# Patient Record
Sex: MALE | Race: BLACK OR AFRICAN AMERICAN
[De-identification: names, ages, dates, MRNs, and addresses within clinical notes are randomized per-mention and may not be internally consistent; named-entity substitution may affect disease eponyms.]

---

## 2019-12-12 ENCOUNTER — HOSPITAL ENCOUNTER (EMERGENCY)
Dept: HOSPITAL 74 - JER | Age: 74
Discharge: HOME | End: 2019-12-12
Payer: COMMERCIAL

## 2019-12-12 VITALS — DIASTOLIC BLOOD PRESSURE: 94 MMHG | SYSTOLIC BLOOD PRESSURE: 143 MMHG | HEART RATE: 88 BPM

## 2019-12-12 VITALS — TEMPERATURE: 97.9 F

## 2019-12-12 VITALS — BODY MASS INDEX: 25.5 KG/M2

## 2019-12-12 DIAGNOSIS — I10: ICD-10-CM
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
## 2019-12-12 DIAGNOSIS — E78.00: ICD-10-CM

## 2019-12-12 DIAGNOSIS — S02.85XB: Primary | ICD-10-CM

## 2019-12-12 PROCEDURE — 0HQ1XZZ REPAIR FACE SKIN, EXTERNAL APPROACH: ICD-10-PCS
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
## 2019-12-17 ENCOUNTER — HOSPITAL ENCOUNTER (EMERGENCY)
Dept: HOSPITAL 74 - JERFT | Age: 74
Discharge: HOME | End: 2019-12-17
Payer: COMMERCIAL

## 2019-12-17 VITALS — BODY MASS INDEX: 25.5 KG/M2

## 2019-12-17 VITALS — DIASTOLIC BLOOD PRESSURE: 79 MMHG | SYSTOLIC BLOOD PRESSURE: 147 MMHG | HEART RATE: 90 BPM | TEMPERATURE: 98 F
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
## 2019-12-17 NOTE — PDOC
Rapid Medical Evaluation


Time Seen by Provider: 12/17/19 14:44


Medical Evaluation: 


 Allergies











Allergy/AdvReac Type Severity Reaction Status Date / Time


 


No Known Allergies Allergy   Verified 12/12/19 11:14











12/17/19 14:44


I have performed a brief in-person evaluation of this patient. 


The patient presents with a chief complaint of: suture removal


Pertinent physical exam findings: 5 stitches to right eye


I have ordered the following: nothing


The patient will proceed to the ED for further evaluation.

## 2020-08-04 ENCOUNTER — HOSPITAL ENCOUNTER (EMERGENCY)
Dept: HOSPITAL 74 - JER | Age: 75
Discharge: HOME | End: 2020-08-04
Payer: COMMERCIAL

## 2020-08-04 VITALS — TEMPERATURE: 96.9 F

## 2020-08-04 VITALS — BODY MASS INDEX: 32.3 KG/M2

## 2020-08-04 VITALS — HEART RATE: 63 BPM | DIASTOLIC BLOOD PRESSURE: 85 MMHG | SYSTOLIC BLOOD PRESSURE: 146 MMHG

## 2020-08-04 DIAGNOSIS — R42: Primary | ICD-10-CM

## 2020-08-04 LAB
ALBUMIN SERPL-MCNC: 3.8 G/DL (ref 3.4–5)
ALP SERPL-CCNC: 79 U/L (ref 45–117)
ALT SERPL-CCNC: 28 U/L (ref 13–61)
ANION GAP SERPL CALC-SCNC: 4 MMOL/L (ref 8–16)
AST SERPL-CCNC: 21 U/L (ref 15–37)
BASOPHILS # BLD: 0.3 % (ref 0–2)
BILIRUB SERPL-MCNC: 0.6 MG/DL (ref 0.2–1)
BUN SERPL-MCNC: 19.9 MG/DL (ref 7–18)
CALCIUM SERPL-MCNC: 9 MG/DL (ref 8.5–10.1)
CHLORIDE SERPL-SCNC: 106 MMOL/L (ref 98–107)
CO2 SERPL-SCNC: 30 MMOL/L (ref 21–32)
CREAT SERPL-MCNC: 1.3 MG/DL (ref 0.55–1.3)
DEPRECATED RDW RBC AUTO: 14.1 % (ref 11.9–15.9)
EOSINOPHIL # BLD: 1.1 % (ref 0–4.5)
GLUCOSE SERPL-MCNC: 128 MG/DL (ref 74–106)
HCT VFR BLD CALC: 40.2 % (ref 35.4–49)
HGB BLD-MCNC: 13 GM/DL (ref 11.7–16.9)
LYMPHOCYTES # BLD: 13 % (ref 8–40)
MAGNESIUM SERPL-MCNC: 2.3 MG/DL (ref 1.8–2.4)
MCH RBC QN AUTO: 28.7 PG (ref 25.7–33.7)
MCHC RBC AUTO-ENTMCNC: 32.4 G/DL (ref 32–35.9)
MCV RBC: 88.4 FL (ref 80–96)
MONOCYTES # BLD AUTO: 5.4 % (ref 3.8–10.2)
NEUTROPHILS # BLD: 80.2 % (ref 42.8–82.8)
PLATELET # BLD AUTO: 257 K/MM3 (ref 134–434)
PMV BLD: 7.7 FL (ref 7.5–11.1)
POTASSIUM SERPLBLD-SCNC: 4.4 MMOL/L (ref 3.5–5.1)
PROT SERPL-MCNC: 7.3 G/DL (ref 6.4–8.2)
RBC # BLD AUTO: 4.54 M/MM3 (ref 4–5.6)
SODIUM SERPL-SCNC: 140 MMOL/L (ref 136–145)
WBC # BLD AUTO: 9.1 K/MM3 (ref 4–10)

## 2020-08-04 PROCEDURE — 3E0337Z INTRODUCTION OF ELECTROLYTIC AND WATER BALANCE SUBSTANCE INTO PERIPHERAL VEIN, PERCUTANEOUS APPROACH: ICD-10-PCS

## 2020-08-04 NOTE — PDOC
History of Present Illness





- General


Chief Complaint: Syncope/Near Syncope


Stated Complaint: LIGHTHEADED


Time Seen by Provider: 08/04/20 11:43


History Source: Patient





- History of Present Illness


Initial Comments: 





08/04/20 11:48


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 

presents from [facility] with episode of lightheadedness and near 

syncope.  Patient was in his usual state of good health, without recent 

infectious or dehydration complaints, when he became lightheaded while seated at

a computer after seeing a patient.  Describes generalized weakness and 

lightheadedness with some nausea and diaphoresis, no headache/vision 

change/speech change/vomiting/focal deficit.  No chest pain or palpitations or 

shortness of breath.  After the initial episode, which lasted about 1 minute, 

symptoms remained very mild and he was able to ambulate without difficulty, 

vital signs at that time were reportedly normal with a blood pressure of 130/80.

 Patient then went back to work at the computer but had another episode of 

severe lightheadedness, so EMS was activated.





Received Zofran in route and 100 cc of normal saline, glucose was normal to 

elevated.





Patient now without complaints, feels much better.





no history of syncope, no history of exercise limitations. no particularly 

unusual environmental conditions during the episode. 





Past History





- Medical History


Allergies/Adverse Reactions: 


                                    Allergies











Allergy/AdvReac Type Severity Reaction Status Date / Time


 


No Known Allergies Allergy   Verified 08/04/20 12:53











Home Medications: 


Ambulatory Orders





Aliskiren Hemifumarate [Aliskiren] 1 tab PO DAILY 12/12/19 


Aspirin 81 mg PO DAILY 12/12/19 


Ezetimibe 1 tab PO DAILY 12/12/19 


Meclizine HCl 1 - 2 tab PO BID PRN #20 tablet 08/04/20 








COPD: No


HTN: Yes


Hypercholesterolemia: Yes





- Psycho-Social/Smoking History


Smoking History: Never smoked





**Review of Systems





- Review of Systems


Constitutional: No: Chills, Fever, Unintentional Wgt. Loss


HEENTM: No: Recent change in vision, Double Vision


Respiratory: No: Cough, Shortness of Breath


Cardiac (ROS): Yes: Lightheadedness.  No: Chest Pain, Palpitations


ABD/GI: Yes: Nausea.  No: Constipated, Diarrhea, Vomiting


Neurological: No: Headache, Numbness, Weakness, Ataxia


All Other Systems: Reviewed and Negative





*Physical Exam





- Physical Exam





08/04/20 12:03


Blood pressure within normal limits, heart rate 50-60, O2 sat normal


GENERAL: The patient is awake, alert, and fully oriented, in no acute distress.


HEAD: Normal with no signs of trauma.


EYES: PERRL, EOMI, sclera anicteric, conjunctiva clear with no pallor.


ENT: Oropharynx clear without exudates.  Moist mucous membranes.


NECK: Normal range of motion, supple without lymphadenopathy, JVD, or masses.


LUNGS: Breath sounds equal, clear to auscultation bilaterally.  No 

wheeze/crackles.


HEART: Regular bradycardia, normal S1 and S2 without murmur or rub.


ABDOMEN: Soft/nontender/nondistended. BS wnl.  No guarding or rebound.  No 

palpable masses. No hepatosplenomegaly.


EXTREMITIES: Normal range of motion, no edema. 2+ distal pulses. No cords, 

erythema, or tenderness.


NEUROLOGICAL:


Mental status: The patient is alert and oriented x3.


Cranial nerves: Cranial nerves II through XII are intact


Motor: The upper extremities are 5 over 5 in all muscle groups. The lower ex

tremities are 5 over 5 in all muscle groups. No pronator drift.


Sensation: Sensation is intact to light touch throughout.


Cerebellar: Finger-finger-nose is normal in both upper extremities. Heel-knee-

shin is normal in both lower extremities.


Reflexes: 2+ and symmetric in the upper and lower extremities.


Gait: Normal. Heel and toe walking are normal. Tandem gait is normal.


PSYCH: Normal mood, normal affect.


SKIN: Warm, Dry, no rashes or lesions noted.





**Heart Score/ECG Review


  ** #1


ECG reviewed & interpreted by me at: 11:36


General ECG Interpretation: Sinus Rhythm, Normal Rate (slight anne at 52), 

Normal Intervals (qtc 409), No acute ischemic changes





ED Treatment Course





- LABORATORY


CBC & Chemistry Diagram: 


                                 08/04/20 11:50





                                 08/04/20 11:50





- RADIOLOGY


Radiology Studies Ordered: 














 Category Date Time Status


 


 CHEST X-RAY PORTABLE* [RAD] [facility] Radiology  08/04/20 11:43 Ordered














Medical Decision Making





- Medical Decision Making





08/04/20 12:04


74-year-old male with history of hypertension presents with lightheadedness/near

 syncope while seated at work, no other red flags on history or physical exam.  

Associated with nausea/diaphoresis/bradycardia, now improved and almost resolved

 except for residual slight bradycardia.  Question vasovagal episode, seems less

 suspicious for acute cardiac event or neurological event. 


Check labs, including troponin x2


EKG, chest x-ray


IV fluids, received antiemetics with EMS


Reassess and disposition accordingly





08/04/20 13:23


improving HR to 67, feels well. seated up texting on cell phone. asx. 


cxr nl. labs pending. ivf running. 


will reassess





08/04/20 14:53


labs wnl, including trop. VSS. 


feels well, but when stood up to urinate earlier had a few seconds of vertigo 

with positional head change. no other neuro complaints and spontaneously 

resolved after seconds. seems c/w peripheral vertigo. 


trial of meclizine, second trop, reassess and dispo





08/04/20 16:41


2nd trop negative. 


feels well, no further light headedness or vertigo. 


MRI pending. Signout to d/c if negative, will f/u with PCP, understands return 

criteria. 





08/04/20 16:45


mri no acute infarct. 


agrees with d/c plan. 





Discharge





- Discharge Information


Problems reviewed: Yes


Clinical Impression/Diagnosis: 


 Light headedness





Condition: Improved





- Additional Discharge Information


Prescriptions: 


Meclizine HCl 1 - 2 tab PO BID PRN #20 tablet


 PRN Reason: Vertigo





- Follow up/Referral


Referrals: 


Aric Callahan MD [Staff Physician] - 





- Patient Discharge Instructions


Patient Printed Discharge Instructions:  DI for Syncope in Adults (Fainting), DI

for Benign Paroxysmal Positional Vertigo


Additional Instructions: 


Activity as tolerated. Stay hydrated. 





Blood test, an EKG, and an MRI of the brain were performed today and showed no 

acute abnormalities.  





Continue your medications as previously prescribed by your physician.





You should follow up with your primary doctor and a  neurologist as soon as 

possible regarding today's emergency department visit.





Return to the emergency department for any new or concerning symptoms, 

particularly persistent or worsening symptoms, chest pain or palpitations, 

headache or focal weakness.





- Post Discharge Activity

## 2020-08-05 NOTE — EKG
Test Reason : 

Blood Pressure : ***/*** mmHG

Vent. Rate : 052 BPM     Atrial Rate : 052 BPM

   P-R Int : 222 ms          QRS Dur : 082 ms

    QT Int : 440 ms       P-R-T Axes : 039 013 026 degrees

   QTc Int : 409 ms

 

SINUS BRADYCARDIA WITH 1ST DEGREE A-V BLOCK

OTHERWISE NORMAL ECG

NO PREVIOUS ECGS AVAILABLE

Confirmed by Leonel Ojeda ([id number]) on 8/5/2020 9:09:31 AM

 

Referred By:             Confirmed By:Leonel Ojeda